# Patient Record
Sex: FEMALE | Race: WHITE | Employment: UNEMPLOYED | URBAN - METROPOLITAN AREA
[De-identification: names, ages, dates, MRNs, and addresses within clinical notes are randomized per-mention and may not be internally consistent; named-entity substitution may affect disease eponyms.]

---

## 2021-07-05 ENCOUNTER — APPOINTMENT (OUTPATIENT)
Dept: GENERAL RADIOLOGY | Age: 21
End: 2021-07-05
Payer: COMMERCIAL

## 2021-07-05 ENCOUNTER — HOSPITAL ENCOUNTER (EMERGENCY)
Age: 21
Discharge: HOME | End: 2021-07-05
Attending: EMERGENCY MEDICINE
Payer: COMMERCIAL

## 2021-07-05 VITALS
RESPIRATION RATE: 18 BRPM | SYSTOLIC BLOOD PRESSURE: 109 MMHG | HEART RATE: 65 BPM | TEMPERATURE: 97.9 F | DIASTOLIC BLOOD PRESSURE: 38 MMHG | OXYGEN SATURATION: 96 %

## 2021-07-05 DIAGNOSIS — M79.672 LEFT FOOT PAIN: ICD-10-CM

## 2021-07-05 DIAGNOSIS — M25.572 ACUTE LEFT ANKLE PAIN: Primary | ICD-10-CM

## 2021-07-05 PROCEDURE — 6370000000 HC RX 637 (ALT 250 FOR IP): Performed by: PHYSICIAN ASSISTANT

## 2021-07-05 PROCEDURE — 73610 X-RAY EXAM OF ANKLE: CPT

## 2021-07-05 PROCEDURE — 73630 X-RAY EXAM OF FOOT: CPT

## 2021-07-05 PROCEDURE — 99284 EMERGENCY DEPT VISIT MOD MDM: CPT

## 2021-07-05 RX ORDER — NAPROXEN 500 MG/1
500 TABLET ORAL 2 TIMES DAILY
Qty: 15 TABLET | Refills: 0 | Status: SHIPPED | OUTPATIENT
Start: 2021-07-05 | End: 2021-07-05 | Stop reason: SDUPTHER

## 2021-07-05 RX ORDER — NAPROXEN 250 MG/1
500 TABLET ORAL ONCE
Status: COMPLETED | OUTPATIENT
Start: 2021-07-05 | End: 2021-07-05

## 2021-07-05 RX ORDER — NAPROXEN 500 MG/1
500 TABLET ORAL 2 TIMES DAILY
Qty: 15 TABLET | Refills: 0 | Status: SHIPPED | OUTPATIENT
Start: 2021-07-05

## 2021-07-05 RX ADMIN — Medication 500 MG: at 13:00

## 2021-07-05 ASSESSMENT — PAIN SCALES - GENERAL
PAINLEVEL_OUTOF10: 9
PAINLEVEL_OUTOF10: 9

## 2021-07-05 NOTE — ED PROVIDER NOTES
EMERGENCY DEPARTMENT ENCOUNTER        PCP: No primary care provider on file. CHIEF COMPLAINT    Chief Complaint   Patient presents with    Foot Pain     left     This patient was not evaluated by the attending physician. I have independently evaluated this patient. HPI    Lino Martinez is a 23 y.o. female who presents with pain localized in the Left foot and ankle. Onset was 4 days ago. The context is patient states that she was wrestling with dog when she tripped going downstairs rolling and injuring left foot and ankle. The pain severity is moderate. The patient has no associated other injuries. The pain is aggravated by ambulation and direct palpation. Denies pregnancy. REVIEW OF SYSTEMS    General: No fever or chills  Skin: No lacerations or puncture wounds  Musculoskeletal: Complains of foot and ankle pain, no other joint pain    All other review of systems are negative  See HPI and nursing notes for additional information     [address]    History reviewed. No pertinent past medical history.   Past Surgical History:   Procedure Laterality Date    APPENDECTOMY         CURRENT MEDICATIONS        ALLERGIES    Allergies   Allergen Reactions    Augmentin [Amoxicillin-Pot Clavulanate]     Rocephin [Ceftriaxone]        SOCIAL & FAMILY HISTORY    Social History     Socioeconomic History    Marital status: None     Spouse name: None    Number of children: None    Years of education: None    Highest education level: None   Occupational History    None   Tobacco Use    Smoking status: Current Every Day Smoker     Packs/day: 1.00     Types: Cigarettes    Smokeless tobacco: Never Used   Substance and Sexual Activity    Alcohol use: Never    Drug use: Never    Sexual activity: None   Other Topics Concern    None   Social History Narrative    None     Social Determinants of Health     Financial Resource Strain:     Difficulty of Paying Living Expenses:    Food Insecurity:  Worried About [facility] in the Last Year:    [address] in the Last Year:    Transportation Needs:     Lack of Transportation (Medical):  Lack of Transportation (Non-Medical):    Physical Activity:     Days of Exercise per Week:     Minutes of Exercise per Session:    Stress:     Feeling of Stress :    Social Connections:     Frequency of Communication with Friends and Family:     Frequency of Social Gatherings with Friends and Family:     Attends [employer] Services:     Active Member of Clubs or Organizations:     Attends Club or Organization Meetings:     Marital Status:    Intimate Partner Violence:     Fear of Current or Ex-Partner:     Emotionally Abused:     Physically Abused:     Sexually Abused:      History reviewed. No pertinent family history. PHYSICAL EXAM    VITAL SIGNS: BP (!) 127/59   Pulse 84   Temp 97.9 °F (36.6 °C) (Oral)   Resp 18   SpO2 97%   Constitutional:  Well developed, appears comfortable  HENT:  Atraumatic  Respiratory:  Nonlabored breathing  Musculoskeletal: The Left foot and ankle demonstrates generalized soft tissue swelling, greatest over lateral aspect. There is tenderness to palpation in this region. Range of motion difficult to assess due to pain - no obvious deficits. The ankle joint is stable. Achilles tendon clinically intact. No swelling, discoloration, or tenderness to palpation of the proximal to distal lower leg bones    Vascular:  DP pulse 2+, capillary refill intact. Integument:  No open wounds. Neurologic:  Awake alert, no slurred speech     RADIOLOGY   XR FOOT LEFT (MIN 3 VIEWS)   Final Result   Left ankle:      No acute fracture or dislocation. Left foot:      No acute fracture or dislocation. XR ANKLE LEFT (MIN 3 VIEWS)   Final Result   Left ankle:      No acute fracture or dislocation. Left foot:      No acute fracture or dislocation.                ED COURSE & MEDICAL DECISION MAKING      Patient presents as above. Patient provided naproxen for pain. Left foot and ankle x-ray shows no acute osseous abnormality. I discussed image results with patient day. I suspect strain. I recommend rest, ice, compression elevation. Patient provided prescription for naproxen for pain. Recommend follow-up with orthopedist in 5 days if no improvement in pain. Clinical  IMPRESSION    1. Acute left ankle pain    2. Left foot pain            Diagnosis and plan discussed in detail with patient who understands and agrees. Patient agrees to return emergency department if symptoms worsen or any new symptoms develop. Comment: Please note this report has been produced using speech recognition software and may contain errors related to that system including errors in grammar, punctuation, and spelling, as well as words and phrases that may be inappropriate. If there are any questions or concerns please feel free to contact the dictating provider for clarification.         Shanna Henderson PA-C  07/05/21 1299

## 2021-07-05 NOTE — ED PROVIDER NOTES
I assigned myself in error. I did not see this patient or participate in her care.     Electronically signed by Ryan Aponte MD on 7/5/2021 at 12:20 PM        Ryan Aponte MD  07/05/21 6061